# Patient Record
Sex: MALE | Race: OTHER | HISPANIC OR LATINO | URBAN - METROPOLITAN AREA
[De-identification: names, ages, dates, MRNs, and addresses within clinical notes are randomized per-mention and may not be internally consistent; named-entity substitution may affect disease eponyms.]

---

## 2024-07-31 ENCOUNTER — EMERGENCY (EMERGENCY)
Facility: HOSPITAL | Age: 54
LOS: 0 days | Discharge: HOME | End: 2024-07-31
Attending: STUDENT IN AN ORGANIZED HEALTH CARE EDUCATION/TRAINING PROGRAM
Payer: SELF-PAY

## 2024-07-31 VITALS
OXYGEN SATURATION: 97 % | TEMPERATURE: 98 F | RESPIRATION RATE: 16 BRPM | DIASTOLIC BLOOD PRESSURE: 89 MMHG | SYSTOLIC BLOOD PRESSURE: 146 MMHG | HEART RATE: 69 BPM

## 2024-07-31 VITALS
DIASTOLIC BLOOD PRESSURE: 73 MMHG | OXYGEN SATURATION: 99 % | SYSTOLIC BLOOD PRESSURE: 151 MMHG | HEART RATE: 77 BPM | TEMPERATURE: 98 F | WEIGHT: 165.13 LBS | RESPIRATION RATE: 18 BRPM

## 2024-07-31 DIAGNOSIS — Z20.822 CONTACT WITH AND (SUSPECTED) EXPOSURE TO COVID-19: ICD-10-CM

## 2024-07-31 DIAGNOSIS — B97.89 OTHER VIRAL AGENTS AS THE CAUSE OF DISEASES CLASSIFIED ELSEWHERE: ICD-10-CM

## 2024-07-31 DIAGNOSIS — R05.9 COUGH, UNSPECIFIED: ICD-10-CM

## 2024-07-31 DIAGNOSIS — R09.81 NASAL CONGESTION: ICD-10-CM

## 2024-07-31 DIAGNOSIS — R50.9 FEVER, UNSPECIFIED: ICD-10-CM

## 2024-07-31 DIAGNOSIS — J06.9 ACUTE UPPER RESPIRATORY INFECTION, UNSPECIFIED: ICD-10-CM

## 2024-07-31 LAB
FLUAV AG NPH QL: SIGNIFICANT CHANGE UP
FLUBV AG NPH QL: SIGNIFICANT CHANGE UP
RSV RNA NPH QL NAA+NON-PROBE: SIGNIFICANT CHANGE UP
SARS-COV-2 RNA SPEC QL NAA+PROBE: SIGNIFICANT CHANGE UP

## 2024-07-31 PROCEDURE — 99053 MED SERV 10PM-8AM 24 HR FAC: CPT

## 2024-07-31 PROCEDURE — 99283 EMERGENCY DEPT VISIT LOW MDM: CPT

## 2024-07-31 PROCEDURE — 0241U: CPT

## 2024-07-31 PROCEDURE — 99284 EMERGENCY DEPT VISIT MOD MDM: CPT

## 2024-07-31 RX ORDER — FLUTICASONE PROPIONATE 50 UG/1
2 SPRAY, METERED NASAL
Qty: 1 | Refills: 0
Start: 2024-07-31 | End: 2024-08-04

## 2024-07-31 NOTE — ED PROVIDER NOTE - CLINICAL SUMMARY MEDICAL DECISION MAKING FREE TEXT BOX
Patient with URI sxs. Vitally stable. Well appearing. Exam normal  Swabbed for viruses. Offered HIV testing and declined.  Ambulating independently in ED. Tolerating PO. Stable for dc. Patient understands return precautions and f/u.     ID number [id number]

## 2024-07-31 NOTE — ED PROVIDER NOTE - NSFOLLOWUPINSTRUCTIONS_ED_ALL_ED_FT
1) Theresa un seguimiento con barber médico de atención primaria en los próximos 5 a 7 días.  Por favor llame mañana para naif claritza.  Si no puede realizar un seguimiento con barber médico de atención primaria, regrese al servicio de urgencias si tiene algún problema urgente.  2) Se le entregó naif copia de las pruebas realizadas hoy.  Traiga los resultados con usted y revíselos con barber médico de atención primaria.  3) Si shira síntomas empeoran o tiene alguna otra inquietud, regrese al servicio de urgencias de inmediato.  4) Continúe tomando shira medicamentos caseros según las indicaciones.

## 2024-07-31 NOTE — ED PROVIDER NOTE - PHYSICAL EXAMINATION
Const: Well appearing, NAD  Eyes: PERRL, EOM intact  ENT: TMs clear b/l. throat clear, no exudates or swelling.   CV: RRR, no murmurs, no chest wall tenderness, distal pulses intact  Resp: CTAB, normal resp effort  GI: soft, nondistended, nontender. No CVA tenderness  MSK: Full ROM, no muscle or bony deformity or tenderness  Neuro: AOx3, GCS 15, No focal deficits  Skin: No rash, laceration or abrasion  Psych: calm, cooperative.

## 2024-07-31 NOTE — ED ADULT TRIAGE NOTE - CHIEF COMPLAINT QUOTE
Pt presents complaining of difficulty breathing and weakness, states he feels like he has phlegm to cough up in his chest. Symptoms began yesterday afternoon. Taken for EKG

## 2024-07-31 NOTE — ED PROVIDER NOTE - PATIENT PORTAL LINK FT
You can access the FollowMyHealth Patient Portal offered by [facility] by registering at the following website: [email or web address]. By joining [employer]’s FollowMyHealth portal, you will also be able to view your health information using other applications (apps) compatible with our system.

## 2024-07-31 NOTE — ED PROVIDER NOTE - OBJECTIVE STATEMENT
54 yo male with hx of presents to the ED for nasal congestion. Began yesterday and states it is difficulty breathing through his nose. Has had to breathe through mouth. Endorses subjective fever, cough, Denies SOB, chest pain.

## 2024-07-31 NOTE — ED ADULT NURSE NOTE - OBJECTIVE STATEMENT
53y male AAOx4 ambulatory to ED complaining of flu-like symptom. Pt reports having nasal congestion for 1x day, with a subjective fever. Pt afebrile during ED stay. Reports having nasal congestion that is making it hard to breathe through the nose, has to breathe through the mouth. Denies productive cough, CP. Labs sent as per MD order. Respirations are even and unlabored, in NAD.

## 2025-05-22 NOTE — ED ADULT TRIAGE NOTE - AS PAIN REST
Quality 226: Preventive Care And Screening: Tobacco Use: Screening And Cessation Intervention: Patient screened for tobacco use and is an ex/non-smoker Quality 47: Advance Care Plan: Advance Care Planning discussed and documented; advance care plan or surrogate decision maker documented in the medical record. Detail Level: Detailed 3 (mild pain)